# Patient Record
Sex: MALE | Race: WHITE | Employment: OTHER | URBAN - METROPOLITAN AREA
[De-identification: names, ages, dates, MRNs, and addresses within clinical notes are randomized per-mention and may not be internally consistent; named-entity substitution may affect disease eponyms.]

---

## 2021-09-13 ENCOUNTER — HOSPITAL ENCOUNTER (OUTPATIENT)
Dept: LAB | Age: 71
Discharge: HOME | End: 2021-09-13
Payer: MEDICARE

## 2021-09-13 DIAGNOSIS — R93.1 ELEVATED CORONARY ARTERY CALCIUM SCORE: ICD-10-CM

## 2021-09-13 DIAGNOSIS — I25.119 CORONARY ARTERY DISEASE INVOLVING NATIVE CORONARY ARTERY OF NATIVE HEART WITH ANGINA PECTORIS (HCC): ICD-10-CM

## 2021-09-13 PROBLEM — R07.9 CHEST PAIN: Status: ACTIVE | Noted: 2021-09-13

## 2021-09-13 PROBLEM — I10 ESSENTIAL HYPERTENSION: Status: ACTIVE | Noted: 2021-09-13

## 2021-09-13 PROBLEM — E78.00 PURE HYPERCHOLESTEROLEMIA: Status: ACTIVE | Noted: 2021-09-13

## 2021-09-13 LAB
ANION GAP SERPL CALC-SCNC: 8 MMOL/L (ref 7–16)
BASOPHILS # BLD: 0.1 K/UL (ref 0–0.2)
BASOPHILS NFR BLD: 1 % (ref 0–2)
BUN SERPL-MCNC: 21 MG/DL (ref 8–23)
CALCIUM SERPL-MCNC: 9.6 MG/DL (ref 8.3–10.4)
CHLORIDE SERPL-SCNC: 104 MMOL/L (ref 98–107)
CO2 SERPL-SCNC: 28 MMOL/L (ref 21–32)
CREAT SERPL-MCNC: 1.09 MG/DL (ref 0.8–1.5)
DIFFERENTIAL METHOD BLD: NORMAL
EOSINOPHIL # BLD: 0.3 K/UL (ref 0–0.8)
EOSINOPHIL NFR BLD: 6 % (ref 0.5–7.8)
ERYTHROCYTE [DISTWIDTH] IN BLOOD BY AUTOMATED COUNT: 13.5 % (ref 11.9–14.6)
GLUCOSE SERPL-MCNC: 101 MG/DL (ref 65–100)
HCT VFR BLD AUTO: 44.1 % (ref 41.1–50.3)
HGB BLD-MCNC: 14.4 G/DL (ref 13.6–17.2)
IMM GRANULOCYTES # BLD AUTO: 0 K/UL (ref 0–0.5)
IMM GRANULOCYTES NFR BLD AUTO: 0 % (ref 0–5)
INR PPP: 1
LYMPHOCYTES # BLD: 1.5 K/UL (ref 0.5–4.6)
LYMPHOCYTES NFR BLD: 27 % (ref 13–44)
MCH RBC QN AUTO: 30.3 PG (ref 26.1–32.9)
MCHC RBC AUTO-ENTMCNC: 32.7 G/DL (ref 31.4–35)
MCV RBC AUTO: 92.8 FL (ref 79.6–97.8)
MONOCYTES # BLD: 0.6 K/UL (ref 0.1–1.3)
MONOCYTES NFR BLD: 11 % (ref 4–12)
NEUTS SEG # BLD: 3 K/UL (ref 1.7–8.2)
NEUTS SEG NFR BLD: 55 % (ref 43–78)
NRBC # BLD: 0 K/UL (ref 0–0.2)
PLATELET # BLD AUTO: 218 K/UL (ref 150–450)
PMV BLD AUTO: 11.3 FL (ref 9.4–12.3)
POTASSIUM SERPL-SCNC: 4 MMOL/L (ref 3.5–5.1)
PROTHROMBIN TIME: 13 SEC (ref 12.6–14.5)
RBC # BLD AUTO: 4.75 M/UL (ref 4.23–5.6)
SODIUM SERPL-SCNC: 140 MMOL/L (ref 136–145)
WBC # BLD AUTO: 5.5 K/UL (ref 4.3–11.1)

## 2021-09-13 PROCEDURE — 80048 BASIC METABOLIC PNL TOTAL CA: CPT

## 2021-09-13 PROCEDURE — 85610 PROTHROMBIN TIME: CPT

## 2021-09-13 PROCEDURE — 36415 COLL VENOUS BLD VENIPUNCTURE: CPT

## 2021-09-13 PROCEDURE — 85025 COMPLETE CBC W/AUTO DIFF WBC: CPT

## 2021-09-13 NOTE — PROGRESS NOTES
Patient pre-assessment complete for [facility] poss with Dr Abdulaziz Mueller scheduled for 21 at 11am , arrival time 9am. Patient verified using . Patient instructed to bring all home medications in labeled bottles on the day of procedure. NPO status reinforced. Patient informed to take a full dose aspirin 325mg  or 81 mg x 4 on the day of procedure. Patient instructed to HOLD HCTZ in am. Instructed they can take all other medications excluding vitamins & supplements. Patient verbalizes understanding of all instructions & denies any questions at this time.

## 2021-09-14 ENCOUNTER — HOSPITAL ENCOUNTER (OUTPATIENT)
Age: 71
Setting detail: OUTPATIENT SURGERY
Discharge: HOME | End: 2021-09-14
Attending: INTERNAL MEDICINE | Admitting: INTERNAL MEDICINE
Payer: MEDICARE

## 2021-09-14 VITALS
WEIGHT: 200 LBS | DIASTOLIC BLOOD PRESSURE: 76 MMHG | HEIGHT: 69 IN | SYSTOLIC BLOOD PRESSURE: 124 MMHG | BODY MASS INDEX: 29.62 KG/M2 | OXYGEN SATURATION: 96 % | HEART RATE: 76 BPM

## 2021-09-14 DIAGNOSIS — R07.9 CHEST PAIN: ICD-10-CM

## 2021-09-14 DIAGNOSIS — I25.119 CORONARY ARTERY DISEASE INVOLVING NATIVE CORONARY ARTERY OF NATIVE HEART WITH ANGINA PECTORIS (HCC): ICD-10-CM

## 2021-09-14 DIAGNOSIS — R07.2 PRECORDIAL PAIN: ICD-10-CM

## 2021-09-14 LAB
ATRIAL RATE: 63 BPM
ATRIAL RATE: 72 BPM
CALCULATED P AXIS, ECG09: 13 DEGREES
CALCULATED P AXIS, ECG09: 20 DEGREES
CALCULATED R AXIS, ECG10: 20 DEGREES
CALCULATED R AXIS, ECG10: 42 DEGREES
CALCULATED T AXIS, ECG11: 42 DEGREES
CALCULATED T AXIS, ECG11: 45 DEGREES
DIAGNOSIS, 93000: NORMAL
DIAGNOSIS, 93000: NORMAL
P-R INTERVAL, ECG05: 238 MS
P-R INTERVAL, ECG05: 242 MS
Q-T INTERVAL, ECG07: 382 MS
Q-T INTERVAL, ECG07: 400 MS
QRS DURATION, ECG06: 80 MS
QRS DURATION, ECG06: 86 MS
QTC CALCULATION (BEZET), ECG08: 409 MS
QTC CALCULATION (BEZET), ECG08: 418 MS
VENTRICULAR RATE, ECG03: 63 BPM
VENTRICULAR RATE, ECG03: 72 BPM

## 2021-09-14 PROCEDURE — 74011250637 HC RX REV CODE- 250/637: Performed by: INTERNAL MEDICINE

## 2021-09-14 PROCEDURE — C1769 GUIDE WIRE: HCPCS | Performed by: INTERNAL MEDICINE

## 2021-09-14 PROCEDURE — 93458 L HRT ARTERY/VENTRICLE ANGIO: CPT | Performed by: INTERNAL MEDICINE

## 2021-09-14 PROCEDURE — 92928 PRQ TCAT PLMT NTRAC ST 1 LES: CPT | Performed by: INTERNAL MEDICINE

## 2021-09-14 PROCEDURE — C1725 CATH, TRANSLUMIN NON-LASER: HCPCS | Performed by: INTERNAL MEDICINE

## 2021-09-14 PROCEDURE — C1887 CATHETER, GUIDING: HCPCS | Performed by: INTERNAL MEDICINE

## 2021-09-14 PROCEDURE — 77030012468 HC VLV BLEEDBK CNTRL ABBT -B: Performed by: INTERNAL MEDICINE

## 2021-09-14 PROCEDURE — 99152 MOD SED SAME PHYS/QHP 5/>YRS: CPT | Performed by: INTERNAL MEDICINE

## 2021-09-14 PROCEDURE — 77030015766: Performed by: INTERNAL MEDICINE

## 2021-09-14 PROCEDURE — 74011000250 HC RX REV CODE- 250: Performed by: INTERNAL MEDICINE

## 2021-09-14 PROCEDURE — 99153 MOD SED SAME PHYS/QHP EA: CPT | Performed by: INTERNAL MEDICINE

## 2021-09-14 PROCEDURE — 77030042317 HC BND COMPR HEMSTAT -B: Performed by: INTERNAL MEDICINE

## 2021-09-14 PROCEDURE — 74011000258 HC RX REV CODE- 258: Performed by: INTERNAL MEDICINE

## 2021-09-14 PROCEDURE — 74011000636 HC RX REV CODE- 636: Performed by: INTERNAL MEDICINE

## 2021-09-14 PROCEDURE — 77030016699 HC CATH ANGI DX INFN1 CARD -A: Performed by: INTERNAL MEDICINE

## 2021-09-14 PROCEDURE — C1894 INTRO/SHEATH, NON-LASER: HCPCS | Performed by: INTERNAL MEDICINE

## 2021-09-14 PROCEDURE — 74011250636 HC RX REV CODE- 250/636: Performed by: INTERNAL MEDICINE

## 2021-09-14 PROCEDURE — 77030004558 HC CATH ANGI DX SUPR TORQ CARD -A: Performed by: INTERNAL MEDICINE

## 2021-09-14 PROCEDURE — 93005 ELECTROCARDIOGRAM TRACING: CPT | Performed by: INTERNAL MEDICINE

## 2021-09-14 PROCEDURE — C1874 STENT, COATED/COV W/DEL SYS: HCPCS | Performed by: INTERNAL MEDICINE

## 2021-09-14 DEVICE — IMPLANTABLE DEVICE: Type: IMPLANTABLE DEVICE | Status: FUNCTIONAL

## 2021-09-14 DEVICE — STENT RONYX30018UX RESOLUTE ONYX 3.00X18
Type: IMPLANTABLE DEVICE | Status: FUNCTIONAL
Brand: RESOLUTE ONYX™

## 2021-09-14 RX ORDER — GUAIFENESIN 100 MG/5ML
81-324 LIQUID (ML) ORAL ONCE
Status: DISCONTINUED | OUTPATIENT
Start: 2021-09-14 | End: 2021-09-14 | Stop reason: HOSPADM

## 2021-09-14 RX ORDER — HEPARIN SODIUM 200 [USP'U]/100ML
INJECTION, SOLUTION INTRAVENOUS
Status: COMPLETED | OUTPATIENT
Start: 2021-09-14 | End: 2021-09-14

## 2021-09-14 RX ORDER — FENTANYL CITRATE 50 UG/ML
INJECTION, SOLUTION INTRAMUSCULAR; INTRAVENOUS AS NEEDED
Status: DISCONTINUED | OUTPATIENT
Start: 2021-09-14 | End: 2021-09-14 | Stop reason: HOSPADM

## 2021-09-14 RX ORDER — SODIUM CHLORIDE 9 MG/ML
75 INJECTION, SOLUTION INTRAVENOUS CONTINUOUS
Status: DISCONTINUED | OUTPATIENT
Start: 2021-09-14 | End: 2021-09-14 | Stop reason: HOSPADM

## 2021-09-14 RX ORDER — SODIUM CHLORIDE 0.9 % (FLUSH) 0.9 %
5-40 SYRINGE (ML) INJECTION EVERY 8 HOURS
Status: DISCONTINUED | OUTPATIENT
Start: 2021-09-14 | End: 2021-09-14 | Stop reason: HOSPADM

## 2021-09-14 RX ORDER — SODIUM CHLORIDE 0.9 % (FLUSH) 0.9 %
5-40 SYRINGE (ML) INJECTION AS NEEDED
Status: DISCONTINUED | OUTPATIENT
Start: 2021-09-14 | End: 2021-09-14 | Stop reason: HOSPADM

## 2021-09-14 RX ORDER — ROSUVASTATIN CALCIUM 40 MG/1
40 TABLET, COATED ORAL DAILY
Qty: 90 TABLET | Refills: 3 | Status: SHIPPED | OUTPATIENT
Start: 2021-09-14 | End: 2022-01-25 | Stop reason: SDUPTHER

## 2021-09-14 RX ORDER — SODIUM CHLORIDE 9 MG/ML
250 INJECTION, SOLUTION INTRAVENOUS CONTINUOUS
Status: ACTIVE | OUTPATIENT
Start: 2021-09-14 | End: 2021-09-14

## 2021-09-14 RX ORDER — MIDAZOLAM HYDROCHLORIDE 1 MG/ML
INJECTION, SOLUTION INTRAMUSCULAR; INTRAVENOUS AS NEEDED
Status: DISCONTINUED | OUTPATIENT
Start: 2021-09-14 | End: 2021-09-14 | Stop reason: HOSPADM

## 2021-09-14 RX ORDER — LIDOCAINE HYDROCHLORIDE 10 MG/ML
INJECTION INFILTRATION; PERINEURAL AS NEEDED
Status: DISCONTINUED | OUTPATIENT
Start: 2021-09-14 | End: 2021-09-14 | Stop reason: HOSPADM

## 2021-09-14 RX ORDER — ASPIRIN 81 MG/1
81 TABLET ORAL DAILY
Qty: 1 TABLET | Refills: 0 | Status: SHIPPED
Start: 2021-09-14

## 2021-09-14 RX ADMIN — SODIUM CHLORIDE 75 ML/HR: 900 INJECTION, SOLUTION INTRAVENOUS at 09:12

## 2021-09-14 NOTE — PROGRESS NOTES
TRANSFER - IN REPORT:    Verbal report received from [facility] on Karin Love  being received from Cath Lab for routine progression of care      Report consisted of patients Situation, Background, Assessment and   Recommendations(SBAR). Information from the following report(s) SBAR was reviewed with the receiving nurse. Opportunity for questions and clarification was provided. Assessment completed upon patients arrival to unit and care assumed.

## 2021-09-14 NOTE — DISCHARGE SUMMARY
[facility] Cardiology Discharge Summary     Patient ID:  Marci Aguilar  [id number]  70 y.o.  1950    Admit date: 9/14/2021    Discharge date:  9/14/2021    Admitting Physician: Negro Rasmussen MD     Discharge Physician: Dr. Lebron Maravilla    Admission Diagnoses: Chest pain [R07.9]    Discharge Diagnoses:    Diagnosis    Coronary artery disease involving native coronary artery of native heart with angina pectoris ([address]    Elevated coronary artery calcium score    Essential hypertension    Pure hypercholesterolemia    Chest pain    Status post PCI     Cardiology Procedures this admission:  Left heart catheterization with PCI  Consults: None    Hospital Course: Patient was seen at the office of [facility] Cardiology by Dr. Daniela Nava for complaints of elevated calcium score, worsening PERSAUD, fatigue and was subsequently scheduled for an AM Admission [facility] at [facility] on 9/14/21. Patient underwent cardiac catheterization by Dr. Lebron Maravilla. Patient was found to have 90% stenosis of the proximal/mid LAD that was stented with overlapping 3.50 x 26 mm and 3.0 x 18 mm Resolute Chun GRISEL with 0% residual stenosis. Patient also found to have mild to moderate nonobstructive disease of the left circumflex and right coronary arteries. Patient tolerated the procedure well and was taken to recovery. Later that afternoon, patient was up feeling well without any complaints of chest pain or shortness of breath. Patient's right radial cath site was clean, dry and intact without hematoma or bruit. Patient's labs were WNL. Patient was seen and examined by Dr. Lebron Maravilla and determined stable and ready for discharge. Patient was instructed on the importance of medication compliance including taking aspirin and Brilinta everyday without missing a dose. After receiving drug eluting stents, the patient will remain on dual anti-platelet therapy for 1 year.   For maximized medical therapy for CAD, patient will continue ACE-I and statin as well. The patient will follow up with [facility] -- Dr Marilu Golden on Oct 11th at 1:00 pm in [facility]. Patient has been referred to cardiac rehab. DISPOSITION: The patient is being discharged home in stable condition on a low saturated fat, low cholesterol and low salt diet. The patient is instructed to advance activities as tolerated to the limit of fatigue or shortness of breath. The patient is instructed to avoid all heavy lifting, straining, stooping or squatting for 3-5 days. The patient is instructed to watch the cath site for bleeding/oozing; if seen, the patient is instructed to apply firm pressure with a clean cloth and call [facility] at [phone]. The patient is instructed to watch for signs of infection which include: increasing area of redness, fever/hot to touch or purulent drainage at the catheterization site. The patient is instructed not to soak in a bathtub for 7-10 days, but is cleared to shower. The patient is instructed to call the office or return to the ER for immediate evaluation for any shortness of breath or chest pain not relieved by NTG. Discharge Exam: Patient has been seen by Dr. Halima Kan: see his progress note for exam details.     Visit Vitals  /76   Pulse 68   Ht 5' 9\" (1.753 m)   Wt 90.7 kg (200 lb)   SpO2 96%   BMI 29.53 kg/m²       Recent Results (from the past 24 hour(s))   EKG, 12 LEAD, INITIAL    Collection Time: 09/14/21  9:48 AM   Result Value Ref Range    Ventricular Rate 63 BPM    Atrial Rate 63 BPM    P-R Interval 242 ms    QRS Duration 86 ms    Q-T Interval 400 ms    QTC Calculation (Bezet) 409 ms    Calculated P Axis 13 degrees    Calculated R Axis 20 degrees    Calculated T Axis 42 degrees    Diagnosis       Sinus rhythm with 1st degree A-V block  Otherwise normal ECG  No previous ECGs available  Confirmed by Yandel Williamson ([id number]) on 9/14/2021 11:23:45 AM       Patient Instructions:   Current Discharge Medication List      START taking these medications    Details   rosuvastatin (CRESTOR) 40 mg tablet Take 1 Tablet by mouth daily. Qty: 90 Tablet, Refills: 3      aspirin delayed-release 81 mg tablet Take 1 Tablet by mouth daily. Qty: 1 Tablet, Refills: 0      ticagrelor (Brilinta) 90 mg tablet Take 1 Tablet by mouth two (2) times a day. Qty: 60 Tablet, Refills: 11         CONTINUE these medications which have NOT CHANGED    Details   hydroCHLOROthiazide (HYDRODIURIL) 25 mg tablet Take 12.5 mg by mouth daily. benazepril (LOTENSIN) 20 mg tablet Take 20 mg by mouth nightly. co-enzyme Q-10 (CO Q-10) 100 mg capsule Take 200 mg by mouth daily. HYDROcodone-acetaminophen (NORCO) 7.5-325 mg per tablet Take 1-2 Tabs by mouth every four (4) hours as needed. Qty: 60 Tab, Refills: 0      traMADol (ULTRAM) 50 mg tablet Take 50 mg by mouth every six (6) hours as needed. Take if needed DOS per anesthesia protocol.           STOP taking these medications       lovastatin (MEVACOR) 40 mg tablet Comments:   Reason for Stopping:         aspirin (ASPIRIN) 325 mg tablet Comments:   Reason for Stopping:               Signed:  VITOR Barnett  9/14/2021  12:13 PM

## 2021-09-14 NOTE — PROGRESS NOTES
Patient received to [address] [place] [address] # 12  Ambulatory from [facility]. Patient scheduled for [facility] today with Dr Juda Hammans. Procedure reviewed & questions answered, voiced good understanding consent obtained & placed on chart. All medications and medical history reviewed. Will prep patient per orders. Patient & family updated on plan of care. The patient has a fraility score of 3-MANAGING WELL, based on patient A&Ox3, patient able to ambulate to room without difficulty.      Patient took aspirin 324mg at 0600 prior to arrival.

## 2021-09-14 NOTE — PROGRESS NOTES
TRANSFER - OUT REPORT:    Verbal report given to RN(name) on Evens Gupta  being transferred to CPRU(unit) for routine progression of care       Report consisted of patients Situation, Background, Assessment and   Recommendations(SBAR). Information from the following report(s) SBAR was reviewed with the receiving nurse.     [facility] with Dr Brenna Brown  2 stents to prox LAD (placed together to extend length)  R Radial  TR band at 12mL  Versed 2mg  Fentanyl 50mcg  Heparin 5000 units in radial cocktail  Angiomax bolus and gtt off at 1125  Brilinta 180mg

## 2021-09-14 NOTE — Clinical Note
TRANSFER - IN REPORT:     Verbal report received from: cpru rn. Report consisted of patient's Situation, Background, Assessment and   Recommendations(SBAR). Opportunity for questions and clarification was provided. Assessment completed upon patient's arrival to unit and care assumed.

## 2021-09-14 NOTE — PROGRESS NOTES
Discharge Same Day as PCI Teaching    Discharge teaching completed. Patient instructed on right radial site care, including symptoms to report to MD, medication changes & Follow up Care to include:    1- Patient is being treated with 2 separate anti-platelet medications including aspirin  & Brilinta. It is very important that these medications are filled today start Brilinta tonight. Patient instructed on the importance of these medications & that these medications must not be stopped for any reason or without talking to the doctor first.  2-  Patient is also being discharged on a medication for cholesterol management (ie-statin) Crestor and instructed on the importance of continuing this medication as well. 3- Patient received a referral for Cardiac Rehab II, contact information was faxed to Cardiac rehab & patient given telephone number to contact ([phone]) Cardiac Rehab if they have not heard from them within 10 days.

## 2021-09-14 NOTE — PROGRESS NOTES
1610-patient ambulated to bath room with no difficulties. Right radial with no bleeding or hematoma. Rband removed and sterile dressing applied to site    1615- all discharge instructions explained to patient and family. Time allowed for questions no. No questions and concerns at this time. 1620- right radialchecked. Site with no redness or bleeding. pt discharged to home via Wheelchair with family.

## 2021-09-14 NOTE — DISCHARGE INSTRUCTIONS
POST PCI/STENT DISCHARGE INSTRUCTIONS    1. Check puncture site frequently for swelling or bleeding. If there is any bleeding, lie down and apply pressure over the area with a clean towel or washcloth and call 911. Notify your doctor for any redness, swelling, drainage, or oozing from the puncture site. Notify your doctor for any fever or chills. 2. If the extremity becomes cold, numb, or painful call [facility] at [phone].    3. Activity should be limited for the next 48-72 hours. No heavy lifting (anything over 10 pounds) for 3 days. No pushing or pulling with right arm for the next three days. 4.  You may resume your usual diet. Drink more fluids than usual.    5.  Have a responsible person drive you home and stay with you for at least 24 hours after your heart catheterization/angiography. No driving for 24 hours. 6. You may remove bandage from your right arm in 24 hours. You may shower in 24 hours. No tub baths, hot tubs, or swimming for 1 week. Do not place any lotions, creams, powders, or ointments over puncture site for 1 week. You may place a clean band-aid over the puncture site each day for 5 days. Change daily. YOU ARE BEING DISCHARGED ON TWO ANTI-PLATELET MEDICATIONS    1- Aspirin 81mg    2- Brilinta    THESE MEDICATIONS  ARE VERY IMPORTANT TO YOUR RECOVERY AND  MUST BE TAKEN EXACTLY AS PRESCRIBED & NOT STOPPED FOR ANY REASON. THESE MAY ONLY BE STOPPED WITH AN ORDER FROM YOUR CARDIOLOGIST. PLEASE HAVE THESE FILLED IMMEDIATELY AND START TAKING Brilinta tonight and resume Aspirin in the morning. YOU ARE ALSO BEING DISCHARGED ON A MEDICATION FOR CHOLESTEROL MANAGEMENT. 1- Crestor    You have also been referred to [facility]. Someone from Cardiac Rehab will be calling you to set up a follow up appointment. If they have not called you within a week,  please call [phone].       Sedation for a Medical Procedure: Care Instructions     You were given a sedative medication during your visit. While many of the effects will have worn   off before you leave; you may continue to feel some effects for several hours. Common side effects from sedation include:  · Feeling sleepy. (Your doctors and nurses will make sure you are not too sleepy to go home.)  · Nausea and vomiting. This usually does not last long. · Feeling tired. How can you care for yourself at home? Activity    · Don't do anything for 24 hours that requires attention to detail. It takes time for the medicine effects to completely wear off. · Do not make important legal decisions for 24 hours. · Do not sign any legal documents for 24 hours. · Do not drink alcohol today     · For your safety, you should not drive or operate heavy machinery for the remainder of the day     · Rest when you feel tired. Getting enough sleep will help you recover. Diet    · You can eat your normal diet, unless your doctor gives you other instructions. If your stomach is upset, try clear liquids and bland, low-fat foods like plain toast or rice. · Drink plenty of fluids (unless your doctor tells you not to). · Don't drink alcohol for 24 hours. Medicines    · Be safe with medicines. Read and follow all instructions on the label. · If the doctor gave you a prescription medicine for pain, take it as prescribed. · If you are not taking a prescription pain medicine, ask your doctor if you can take an over-the-counter medicine. · If you think your pain medicine is making you sick to your stomach:  · Take your medicine after meals (unless your doctor has told you not to). · Ask your doctor for a different pain medicine. Percutaneous Coronary Intervention: What to Expect at [facility]     Percutaneous coronary intervention (PCI) is the name for procedures that are used to open a narrowed or blocked coronary artery.  The two most common PCI procedures are coronary angioplasty and coronary stent placement. Your groin or arm may have a bruise and feel sore for a day or two after a percutaneous coronary intervention (PCI). You can do light activities around the house, but nothing strenuous for several days. This care sheet gives you a general idea about how long it will take for you to recover. But each person recovers at a different pace. Follow the steps below to get better as quickly as possible. How can you care for yourself at home? Activity  · Do not do strenuous exercise and do not lift, pull, or push anything heavy until your doctor says it is okay. This may be for a day or two. You can walk around the house and do light activity, such as cooking. · You may shower 24 to 48 hours after the procedure, if your doctor okays it. Pat the incision dry. Do not take a bath for 1 week, or until your doctor tells you it is okay. · If the catheter was placed in your groin, try not to walk up stairs for the first couple of days. · If the catheter was placed in your arm near your wrist, do not bend your wrist deeply for the first couple of days. Be careful using your hand to get into and out of a chair or bed. · If your doctor recommends it, get more exercise. Walking is a good choice. Bit by bit, increase the amount you walk every day. Try for at least 30 minutes on most days of the week. Diet  · Drink plenty of fluids to help your body flush out the dye. If you have kidney, heart, or liver disease and have to limit fluids, talk with your doctor before you increase the amount of fluids you drink. · Keep eating a heart-healthy diet that has lots of fruits, vegetables, and whole grains. If you have not been eating this way, talk to your doctor. You also may want to talk to a dietitian. This expert can help you to learn about healthy foods and plan meals. Medicines  · Your doctor will tell you if and when you can restart your medicines.  He or she will also give you instructions about taking any new medicines. · If you take blood thinners, such as warfarin (Coumadin), clopidogrel (Plavix), or aspirin, be sure to talk to your doctor. He or she will tell you if and when to start taking those medicines again. Make sure that you understand exactly what your doctor wants you to do. · Your doctor will prescribe blood-thinning medicines. You will likely take aspirin plus another antiplatelet, such as clopidogrel (Plavix). It is very important that you take these medicines exactly as directed. These medicines help keep the coronary artery open and reduce your risk of a heart attack. · Call your doctor if you think you are having a problem with your medicine. Care of the catheter site  · For 1 or 2 days, keep a bandage over the spot where the catheter was inserted. The bandage probably will fall off in this time. · Put ice or a cold pack on the area for 10 to 20 minutes at a time to help with soreness or swelling. Put a thin cloth between the ice and your skin. Follow-up care is a key part of your treatment and safety. Be sure to make and go to all appointments, and call your doctor if you are having problems. It's also a good idea to know your test results and keep a list of the medicines you take. When should you call for help? Call 911 anytime you think you may need emergency care. For example, call if:  · You passed out (lost consciousness). · You have severe trouble breathing. · You have sudden chest pain and shortness of breath, or you cough up blood. · You have symptoms of a heart attack, such as:  ¨ Chest pain or pressure. ¨ Sweating. ¨ Shortness of breath. ¨ Nausea or vomiting. ¨ Pain that spreads from the chest to the neck, jaw, or one or both shoulders or arms. ¨ Dizziness or lightheadedness. ¨ A fast or uneven pulse. After calling 911, chew 1 adult-strength aspirin. Wait for an ambulance. Do not try to drive yourself.   · You have been diagnosed with angina, and you have angina symptoms that do not go away with rest or are not getting better within 5 minutes after you take one dose of nitroglycerin. Call your doctor now or seek immediate medical care if:  · You are bleeding from the area where the catheter was put in your artery. · You have a fast-growing, painful lump at the catheter site. · You have signs of infection, such as:  ¨ Increased pain, swelling, warmth, or redness. ¨ Red streaks leading from the catheter site. ¨ Pus draining from the catheter site. ¨ A fever. · Your leg or arm looks blue or feels cold, numb, or tingly. Watch closely for changes in your health, and be sure to contact your doctor if you have any problems. Where can you learn more? Go to [email or web address].be  Enter T154 in the search box to learn more about \"Percutaneous Coronary Intervention: What to Expect at Home. \"   © 8730-9482 [employer]. Care instructions adapted under license by [employer] (which disclaims liability or warranty for this information). This care instruction is for use with your licensed healthcare professional. If you have questions about a medical condition or this instruction, always ask your healthcare professional. [address] any warranty or liability for your use of this information. Content Version: 95.0.373869; Current as of: May 22, 2015         Cardiac Rehabilitation: Care Instructions  Your Care Instructions    Cardiac rehabilitation is a program for people who have a heart problem, such as a heart attack, heart failure, or a heart valve disease. The program includes exercise, lifestyle changes, education, and emotional support. Cardiac rehab can help you improve the quality of your life through better overall health. It can help you lose weight and feel better about yourself.   On your cardiac rehab team, you may have your doctor, a nurse specialist, a dietitian, and a physical therapist. They will design your cardiac rehab program specifically for you. You will learn how to reduce your risk for heart problems, how to manage stress, and how to eat a heart-healthy diet. By the end of the program, you will be ready to maintain a healthier lifestyle on your own. Follow-up care is a key part of your treatment and safety. Be sure to make and go to all appointments, and call your doctor if you are having problems. It's also a good idea to know your test results and keep a list of the medicines you take. How can you care for yourself at home? · Take your medicines exactly as prescribed. Call your doctor if you think you are having a problem with your medicine. You will get more details on the specific medicines your doctor prescribes. · Weigh yourself every day if your doctor tells you to. Watch for sudden weight gain. Weigh yourself on the same scale with the same amount of clothing at the same time of day. · Plan your meals so that you are eating heart-healthy foods. ¨ Eat a variety of foods daily. Fresh fruits and vegetables and whole-grains are good choices. ¨ Limit your fat intake, especially saturated and trans fat. ¨ Limit salt (sodium). ¨ Increase fiber in your diet. ¨ Limit alcohol. · Learn how to take your pulse so that you can track your heart rate during exercise. · Always check with your doctor before you begin a new exercise program.  · Warm up before you exercise and cool down afterward for at least 15 minutes each. This will help your heart gradually prepare for and recover from exercise and avoid pushing your heart too hard. · Stop exercising if you have any unusual discomfort, such as chest pain. · Do not smoke. Smoking can make heart problems worse. If you need help quitting, talk to your doctor about stop-smoking programs and medicines. These can increase your chances of quitting for good. When should you call for help? Call 911 anytime you think you may need emergency care. For example, call if:  · You have severe trouble breathing. · You cough up pink, foamy mucus and you have trouble breathing. · You have symptoms of a heart attack. These may include:  ¨ Chest pain or pressure, or a strange feeling in the chest.  ¨ Sweating. ¨ Shortness of breath. ¨ Nausea or vomiting. ¨ Pain, pressure, or a strange feeling in the back, neck, jaw, or upper belly or in one or both shoulders or arms. ¨ Lightheadedness or sudden weakness. ¨ A fast or irregular heartbeat. After you call 911, the  may tell you to chew 1 adult-strength or 2 to 4 low-dose aspirin. Wait for an ambulance. Do not try to drive yourself. · You have angina symptoms (such as chest pain or pressure) that do not go away with rest or are not getting better within 5 minutes after you take a dose of nitroglycerin. · You have symptoms of a stroke. These may include:  ¨ Sudden numbness, tingling, weakness, or loss of movement in your face, arm, or leg, especially on only one side of your body. ¨ Sudden vision changes. ¨ Sudden trouble speaking. ¨ Sudden confusion or trouble understanding simple statements. ¨ Sudden problems with walking or balance. ¨ A sudden, severe headache that is different from past headaches. · You passed out (lost consciousness). Call your doctor now or seek immediate medical care if:  · You have new or increased shortness of breath. · You are dizzy or lightheaded, or you feel like you may faint. · You gain weight suddenly, such as 3 pounds or more in 2 to 3 days. (Your doctor may suggest a different range of weight gain.)  · You have increased swelling in your legs, ankles, or feet. Watch closely for changes in your health, and be sure to contact your doctor if you have any problems. Where can you learn more? Go to http://www.gray.com/. Enter P369 in the search box to learn more about \"Cardiac Rehabilitation: Care Instructions. \"  Current as of:  May 5, 2016  Content Version: 11.1  © 8543-6354 [employer]. Care instructions adapted under license by [employer] (which disclaims liability or warranty for this information). If you have questions about a medical condition or this instruction, always ask your healthcare professional. [address] any warranty or liability for your use of this information. [employer] Activation    Thank you for requesting access to [facility]. Please follow the instructions below to securely access and download your online medical record. [facility] allows you to send messages to your doctor, view your test results, renew your prescriptions, schedule appointments, and more. How Do I Sign Up? 1. In your internet browser, go to [email or web address]. 2. Click on the First Time User? Click Here link in the Sign In box. You will see the New Member Sign Up page. 3. Enter your [facility] Access Code exactly as it appears below. You will not need to use this code after youve completed the sign-up process. If you do not sign up before the expiration date, you must request a new code. [facility] Access Code: Activation code not generated  Current [facility] Status: Active (This is the date your [facility] access code will )    4. Enter the last four digits of your Social Security Number (xxxx) and Date of Birth (mm/dd/yyyy) as indicated and click Submit. You will be taken to the next sign-up page. 5. Create a [facility] ID. This will be your [facility] login ID and cannot be changed, so think of one that is secure and easy to remember. 6. Create a [facility] password. You can change your password at any time. 7. Enter your Password Reset Question and Answer. This can be used at a later time if you forget your password. 8. Enter your e-mail address. You will receive e-mail notification when new information is available in [address] 9. Click Sign Up.  You can now view and download portions of your medical record. 10. Click the Download Summary menu link to download a portable copy of your medical information. Additional Information    If you have questions, please visit the Frequently Asked Questions section of the "[employer]" website at [email or web address] Remember, "[employer]" is NOT to be used for urgent needs. For medical emergencies, dial 911.

## 2021-10-11 PROBLEM — I25.10 CORONARY ARTERY DISEASE INVOLVING NATIVE CORONARY ARTERY OF NATIVE HEART WITHOUT ANGINA PECTORIS: Status: ACTIVE | Noted: 2021-09-13

## 2022-03-19 PROBLEM — R07.9 CHEST PAIN: Status: ACTIVE | Noted: 2021-09-13

## 2022-03-19 PROBLEM — I10 ESSENTIAL HYPERTENSION: Status: ACTIVE | Noted: 2021-09-13

## 2022-03-19 PROBLEM — R93.1 ELEVATED CORONARY ARTERY CALCIUM SCORE: Status: ACTIVE | Noted: 2021-09-13

## 2022-03-19 PROBLEM — I25.10 CORONARY ARTERY DISEASE INVOLVING NATIVE CORONARY ARTERY OF NATIVE HEART WITHOUT ANGINA PECTORIS: Status: ACTIVE | Noted: 2021-09-13

## 2022-03-20 PROBLEM — E78.00 PURE HYPERCHOLESTEROLEMIA: Status: ACTIVE | Noted: 2021-09-13

## 2022-08-23 ENCOUNTER — OFFICE VISIT (OUTPATIENT)
Dept: CARDIOLOGY CLINIC | Age: 72
End: 2022-08-23
Payer: MEDICARE

## 2022-08-23 VITALS
DIASTOLIC BLOOD PRESSURE: 80 MMHG | HEIGHT: 69 IN | WEIGHT: 205 LBS | BODY MASS INDEX: 30.36 KG/M2 | SYSTOLIC BLOOD PRESSURE: 138 MMHG | HEART RATE: 64 BPM

## 2022-08-23 DIAGNOSIS — I25.10 CORONARY ARTERY DISEASE INVOLVING NATIVE CORONARY ARTERY OF NATIVE HEART WITHOUT ANGINA PECTORIS: ICD-10-CM

## 2022-08-23 DIAGNOSIS — I25.10 ATHEROSCLEROSIS OF NATIVE CORONARY ARTERY OF NATIVE HEART WITHOUT ANGINA PECTORIS: Primary | ICD-10-CM

## 2022-08-23 DIAGNOSIS — I10 ESSENTIAL HYPERTENSION: ICD-10-CM

## 2022-08-23 DIAGNOSIS — E78.00 PURE HYPERCHOLESTEROLEMIA: ICD-10-CM

## 2022-08-23 PROBLEM — R93.1 ELEVATED CORONARY ARTERY CALCIUM SCORE: Status: RESOLVED | Noted: 2021-09-13 | Resolved: 2022-08-23

## 2022-08-23 PROCEDURE — 93000 ELECTROCARDIOGRAM COMPLETE: CPT | Performed by: INTERNAL MEDICINE

## 2022-08-23 PROCEDURE — G8427 DOCREV CUR MEDS BY ELIG CLIN: HCPCS | Performed by: INTERNAL MEDICINE

## 2022-08-23 PROCEDURE — 1123F ACP DISCUSS/DSCN MKR DOCD: CPT | Performed by: INTERNAL MEDICINE

## 2022-08-23 PROCEDURE — G8417 CALC BMI ABV UP PARAM F/U: HCPCS | Performed by: INTERNAL MEDICINE

## 2022-08-23 PROCEDURE — 3017F COLORECTAL CA SCREEN DOC REV: CPT | Performed by: INTERNAL MEDICINE

## 2022-08-23 PROCEDURE — 99214 OFFICE O/P EST MOD 30 MIN: CPT | Performed by: INTERNAL MEDICINE

## 2022-08-23 PROCEDURE — 1036F TOBACCO NON-USER: CPT | Performed by: INTERNAL MEDICINE

## 2022-08-23 NOTE — PROGRESS NOTES
[address], [address] [facility], [address] [place] [address]  PHONE: [phone]     22    NAME:  Mindi Carmichael  : 1950  MRN: [id number]       SUBJECTIVE:   Mindi Carmichael is a 67 y.o. male seen for a follow up visit regarding the following:     Chief Complaint   Patient presents with    Coronary Artery Disease     6 month f/u        HPI:   LHC/PCI 2021:  (contrast induced CKD afterwards)    Complex proximal/mid LAD ulcerated mixed plaque morphology treated with Justin drug-eluting stent x2    Mild to moderate nonobstructive disease of the left circumflex and right coronary arteries. Echo 2021: normal EF      On the treadmill at the gym, no angina. No new BERRY, edema. Sleeping more now. Some knee issues, better now. NO angina, CP. Feeling well now. Patient denies recent history of orthopnea, PND, excessive dizziness and/or syncope. 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 Been on ASA and statin. Past Medical History, Past Surgical History, Family history, Social History, and Medications were all reviewed with the patient today and updated as necessary. Current Outpatient Medications   Medication Sig Dispense Refill    aspirin 81 MG EC tablet Take 81 mg by mouth daily      benazepril (LOTENSIN) 20 MG tablet Take 20 mg by mouth      coenzyme Q10 100 MG CAPS capsule Take 200 mg by mouth daily      rosuvastatin (CRESTOR) 40 MG tablet Take 40 mg by mouth daily      ticagrelor (BRILINTA) 90 MG TABS tablet Take 90 mg by mouth 2 times daily       No current facility-administered medications for this visit.         Allergies   Allergen Reactions    Oxycodone Hives     itching     Patient Active Problem List    Diagnosis Date Noted    Coronary artery disease involving native coronary artery of native heart without angina pectoris 2021    Chest pain 2021     Added automatically from request for surgery [id number]        Essential hypertension 09/13/2021    Pure hypercholesterolemia 09/13/2021    Status post hip replacement 02/22/2013      Past Surgical History:   Procedure Laterality Date    ORTHOPEDIC SURGERY  1987    right ruptured achilles tendon repair    TOTAL HIP ARTHROPLASTY  2/2013    left     Family History   Problem Relation Age of Onset    Sudden Death Maternal Uncle     Coronary Art Dis Maternal Uncle      Social History     Tobacco Use    Smoking status: Never    Smokeless tobacco: Never   Substance Use Topics    Alcohol use: Yes         ROS:    No obvious pertinent positives on review of systems except for what was outlined in the HPI today. PHYSICAL EXAM:     /80 Comment: Home re-check  Pulse 64 Comment: per EKG  Ht 5' 9\" (1.753 m)   Wt 205 lb (93 kg)   BMI 30.27 kg/m²    General/Constitutional:   Alert and oriented x 3, no acute distress  HEENT:   normocephalic, atraumatic, moist mucous membranes  Neck:   No JVD or carotid bruits bilaterally  Cardiovascular:   regular rate and rhythm, no murmur/rub/gallop appreciated  Pulmonary:   clear to auscultation bilaterally, no respiratory distress  Abdomen:   soft, non-tender, non-distended  Ext:   No sig LE edema bilaterally  Skin:  warm and dry, no obvious rashes seen  Neuro:   no obvious sensory or motor deficits  Psychiatric:   normal mood and affect      EKG Today and independently reviewed by me: sinus rhythm, normal intervals and non-specific ST/T wave changes.       Lab Results   Component Value Date/Time     09/13/2021 12:00 PM    K 4.0 09/13/2021 12:00 PM     09/13/2021 12:00 PM    CO2 28 09/13/2021 12:00 PM    BUN 21 09/13/2021 12:00 PM    CREATININE 1.09 09/13/2021 12:00 PM    GLUCOSE 101 09/13/2021 12:00 PM    CALCIUM 9.6 09/13/2021 12:00 PM        Lab Results   Component Value Date    WBC 5.5 09/13/2021    HGB 14.4 09/13/2021    HCT 44.1 09/13/2021    MCV 92.8 09/13/2021     09/13/2021       No results found for: TSHFT4, TSH    No results found for: LABA1C  No results found for: EAG    No results found for: CHOL  No results found for: TRIG  No results found for: HDL  No results found for: LDLCHOLESTEROL, LDLCALC  No results found for: LABVLDL, VLDL  No results found for: CHOLHDLRATIO        I have Independently reviewed prior care notes, any ER records available, cardiac testing, labs and results with the patient and before seeing the patient today. Also independently reviewed outside records when available. ASSESSMENT:    Xi Lombardo was seen today for coronary artery disease. Diagnoses and all orders for this visit:    Atherosclerotic heart disease of native coronary artery without angina pectoris  -     EKG 12 lead; Future  -     EKG 12 lead    Essential hypertension    Coronary artery disease involving native coronary artery of native heart without angina pectoris    Pure hypercholesterolemia        PLAN:    1. CAD:  Remain on DAPT, crestor. DAPT long term, doing well on this now. Echo ok. The patient has been instructed to call with any angina or equivalent as reviewed today. All questions were answered with the patient voicing complete understanding. 2. HPL:  Remain on crestor, goal LDL < 70.        3. Carotid Dz: Followed by Dr. Genet Bedoya. 4. HTN:  Follow Cr, possible contrast induced CKD. Off the HCTZ. Getting renal consult. Patient has been instructed and agrees to call our office with any issues or other concerns related to their cardiac condition(s) and/or complaint(s). Return in about 6 months (around 2/23/2023).        KATHERINE MAIER,   8/23/2022

## 2023-03-14 ENCOUNTER — OFFICE VISIT (OUTPATIENT)
Dept: CARDIOLOGY CLINIC | Age: 73
End: 2023-03-14
Payer: COMMERCIAL

## 2023-03-14 VITALS
HEART RATE: 72 BPM | HEIGHT: 69 IN | SYSTOLIC BLOOD PRESSURE: 132 MMHG | DIASTOLIC BLOOD PRESSURE: 76 MMHG | WEIGHT: 210.4 LBS | BODY MASS INDEX: 31.16 KG/M2

## 2023-03-14 DIAGNOSIS — I25.10 CORONARY ARTERY DISEASE INVOLVING NATIVE CORONARY ARTERY OF NATIVE HEART WITHOUT ANGINA PECTORIS: Primary | ICD-10-CM

## 2023-03-14 DIAGNOSIS — E78.00 PURE HYPERCHOLESTEROLEMIA: ICD-10-CM

## 2023-03-14 DIAGNOSIS — I10 ESSENTIAL HYPERTENSION: ICD-10-CM

## 2023-03-14 PROCEDURE — 3078F DIAST BP <80 MM HG: CPT | Performed by: INTERNAL MEDICINE

## 2023-03-14 PROCEDURE — 99214 OFFICE O/P EST MOD 30 MIN: CPT | Performed by: INTERNAL MEDICINE

## 2023-03-14 PROCEDURE — 1123F ACP DISCUSS/DSCN MKR DOCD: CPT | Performed by: INTERNAL MEDICINE

## 2023-03-14 PROCEDURE — 3075F SYST BP GE 130 - 139MM HG: CPT | Performed by: INTERNAL MEDICINE

## 2023-03-14 RX ORDER — VALSARTAN AND HYDROCHLOROTHIAZIDE 80; 12.5 MG/1; MG/1
1 TABLET, FILM COATED ORAL DAILY
COMMUNITY
Start: 2023-01-18 | End: 2024-01-18

## 2023-03-14 RX ORDER — ROSUVASTATIN CALCIUM 40 MG/1
40 TABLET, COATED ORAL DAILY
Qty: 90 TABLET | Refills: 3 | Status: SHIPPED | OUTPATIENT
Start: 2023-03-14

## 2023-03-14 RX ORDER — CETIRIZINE HYDROCHLORIDE 10 MG/1
10 TABLET ORAL DAILY
COMMUNITY

## 2023-03-14 NOTE — PROGRESS NOTES
[address], [address]  PHONE: [phone]     23    NAME:  Krystyna Rubalcava  : 1950  MRN: [id number]       SUBJECTIVE:   Krystyna Rubalcava is a 68 y.o. male seen for a follow up visit regarding the following:     Chief Complaint   Patient presents with    Coronary Artery Disease     6 month follow up       HPI:   LHC/PCI 2021:  (contrast induced CKD afterwards)    Complex proximal/mid LAD ulcerated mixed plaque morphology treated with [place] drug-eluting stent x2    Mild to moderate nonobstructive disease of the left circumflex and right coronary arteries. Echo 2021: normal EF      On the treadmill at the gym, no angina. No new BERRY, edema. diet better now. NO angina, CP. Feeling well now. Patient denies recent history of orthopnea, PND, excessive dizziness and/or syncope. 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 Been on ASA and statin. Past Medical History, Past Surgical History, Family history, Social History, and Medications were all reviewed with the patient today and updated as necessary. Current Outpatient Medications   Medication Sig Dispense Refill    valsartan-hydroCHLOROthiazide (DIOVAN-HCT) 80-12.5 MG per tablet Take 1 tablet by mouth daily      cetirizine (ZYRTEC) 10 MG tablet Take 10 mg by mouth daily      ticagrelor (BRILINTA) 90 MG TABS tablet Take 1 tablet by mouth 2 times daily 180 tablet 3    rosuvastatin (CRESTOR) 40 MG tablet Take 1 tablet by mouth daily 90 tablet 3    aspirin 81 MG EC tablet Take 81 mg by mouth daily      coenzyme Q10 100 MG CAPS capsule Take 200 mg by mouth daily (Patient not taking: Reported on 3/14/2023)       No current facility-administered medications for this visit.         Allergies   Allergen Reactions    Oxycodone Hives     itching     Patient Active Problem List    Diagnosis Date Noted    Coronary artery disease involving native coronary artery of native heart without angina pectoris 09/13/2021    Chest pain 09/13/2021     Added automatically from request for surgery [id number]        Essential hypertension 09/13/2021    Pure hypercholesterolemia 09/13/2021    Status post hip replacement 02/22/2013      Past Surgical History:   Procedure Laterality Date    ORTHOPEDIC SURGERY  1987    right ruptured achilles tendon repair    TOTAL HIP ARTHROPLASTY  2/2013    left     Family History   Problem Relation Age of Onset    Sudden Death Maternal Uncle     Coronary Art Dis Maternal Uncle      Social History     Tobacco Use    Smoking status: Never    Smokeless tobacco: Never   Substance Use Topics    Alcohol use: Yes         ROS:    No obvious pertinent positives on review of systems except for what was outlined in the HPI today.       PHYSICAL EXAM:     /76   Pulse 72   Ht 5' 9\" (1.753 m)   Wt 210 lb 6.4 oz (95.4 kg)   BMI 31.07 kg/m²    General/Constitutional:   Alert and oriented x 3, no acute distress  HEENT:   normocephalic, atraumatic, moist mucous membranes  Neck:   No JVD or carotid bruits bilaterally  Cardiovascular:   regular rate and rhythm, no murmur/rub/gallop appreciated  Pulmonary:   clear to auscultation bilaterally, no respiratory distress  Abdomen:   soft, non-tender, non-distended  Ext:   No sig LE edema bilaterally  Skin:  warm and dry, no obvious rashes seen  Neuro:   no obvious sensory or motor deficits  Psychiatric:   normal mood and affect      Lab Results   Component Value Date/Time     09/13/2021 12:00 PM    K 4.0 09/13/2021 12:00 PM     09/13/2021 12:00 PM    CO2 28 09/13/2021 12:00 PM    BUN 21 09/13/2021 12:00 PM    CREATININE 1.09 09/13/2021 12:00 PM    GLUCOSE 101 09/13/2021 12:00 PM    CALCIUM 9.6 09/13/2021 12:00 PM        Lab Results   Component Value Date    WBC 5.5 09/13/2021    HGB 14.4 09/13/2021    HCT 44.1 09/13/2021    MCV 92.8 09/13/2021     09/13/2021       No results found for: TSHFT4, TSH    No results found for: LABA1C  No results found for: EAG    No results found for: CHOL  No results found for: TRIG  No results found for: HDL  No results found for: LDLCHOLESTEROL, LDLCALC  No results found for: LABVLDL, VLDL  No results found for: CHOLHDLRATIO        I have Independently reviewed prior care notes, any ER records available, cardiac testing, labs and results with the patient and before seeing the patient today. Also independently reviewed outside records when available. ASSESSMENT:    Mary Ann Pena was seen today for coronary artery disease. Diagnoses and all orders for this visit:    Coronary artery disease involving native coronary artery of native heart without angina pectoris    Essential hypertension    Pure hypercholesterolemia    Other orders  -     ticagrelor (BRILINTA) 90 MG TABS tablet; Take 1 tablet by mouth 2 times daily  -     rosuvastatin (CRESTOR) 40 MG tablet; Take 1 tablet by mouth daily        PLAN:    1. CAD:  Remain on DAPT, crestor. DAPT long term, doing well on this now. HbA1c < 6. BETTER! No more sodas. The patient has been instructed to call with any angina or equivalent as reviewed today. All questions were answered with the patient voicing complete understanding. 2. HPL:  Remain on crestor, goal LDL < 70. Labs Tasha 1/11/2023: LDL 76, HDL 33, Trig 134      3. Carotid Dz: Followed by Dr. Karma Celeste. 4. HTN:  back on HCTZ, Cr better now. Patient has been instructed and agrees to call our office with any issues or other concerns related to their cardiac condition(s) and/or complaint(s). Return in about 6 months (around 9/14/2023).        Carlos Lopez DO  3/14/2023

## 2023-09-11 ENCOUNTER — OFFICE VISIT (OUTPATIENT)
Age: 73
End: 2023-09-11
Payer: COMMERCIAL

## 2023-09-11 VITALS
HEART RATE: 64 BPM | WEIGHT: 201 LBS | SYSTOLIC BLOOD PRESSURE: 126 MMHG | DIASTOLIC BLOOD PRESSURE: 78 MMHG | HEIGHT: 69 IN | BODY MASS INDEX: 29.77 KG/M2

## 2023-09-11 DIAGNOSIS — I25.10 CORONARY ARTERY DISEASE INVOLVING NATIVE CORONARY ARTERY OF NATIVE HEART WITHOUT ANGINA PECTORIS: Primary | ICD-10-CM

## 2023-09-11 DIAGNOSIS — I10 ESSENTIAL HYPERTENSION: ICD-10-CM

## 2023-09-11 DIAGNOSIS — E78.00 PURE HYPERCHOLESTEROLEMIA: ICD-10-CM

## 2023-09-11 PROCEDURE — 3078F DIAST BP <80 MM HG: CPT | Performed by: INTERNAL MEDICINE

## 2023-09-11 PROCEDURE — 1123F ACP DISCUSS/DSCN MKR DOCD: CPT | Performed by: INTERNAL MEDICINE

## 2023-09-11 PROCEDURE — 3074F SYST BP LT 130 MM HG: CPT | Performed by: INTERNAL MEDICINE

## 2023-09-11 PROCEDURE — 93000 ELECTROCARDIOGRAM COMPLETE: CPT | Performed by: INTERNAL MEDICINE

## 2023-09-11 PROCEDURE — 99214 OFFICE O/P EST MOD 30 MIN: CPT | Performed by: INTERNAL MEDICINE

## 2023-09-11 RX ORDER — LOVASTATIN 40 MG/1
40 TABLET ORAL NIGHTLY
COMMUNITY

## 2023-09-11 RX ORDER — VALSARTAN AND HYDROCHLOROTHIAZIDE 80; 12.5 MG/1; MG/1
1 TABLET, FILM COATED ORAL DAILY
Qty: 90 TABLET | Refills: 3 | Status: SHIPPED | OUTPATIENT
Start: 2023-09-11 | End: 2024-09-10

## 2023-09-11 NOTE — TELEPHONE ENCOUNTER
----- Message from Duarte Rooney DO sent at 9/11/2023 11:11 AM EDT -----  Needs on repatha, cannot take statins, please send in.  Thanks

## 2023-09-11 NOTE — PROGRESS NOTES
[address] [facility], [address]  PHONE: [phone]     23    NAME:  Renny Almaguer  : 1950  MRN: [id number]       SUBJECTIVE:   Renny Almaguer is a 68 y.o. male seen for a follow up visit regarding the following:     Chief Complaint   Patient presents with    Coronary Artery Disease         HPI:   LHC/PCI 2021:  (contrast induced CKD afterwards)    Complex proximal/mid LAD ulcerated mixed plaque morphology treated with [place] drug-eluting stent x2    Mild to moderate nonobstructive disease of the left circumflex and right coronary arteries. Echo 2021: normal EF     Pains on statins now. No CP, BERRY. No new angina at the gym. No new BERRY, edema. diet better now. NO angina, CP. Feeling well now. Patient denies recent history of orthopnea, PND, excessive dizziness and/or syncope. 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 Been on ASA and statin. Past Medical History, Past Surgical History, Family history, Social History, and Medications were all reviewed with the patient today and updated as necessary. Current Outpatient Medications   Medication Sig Dispense Refill    lovastatin (MEVACOR) 40 MG tablet Take 1 tablet by mouth nightly      ticagrelor (BRILINTA) 90 MG TABS tablet Take 1 tablet by mouth 2 times daily 180 tablet 3    valsartan-hydroCHLOROthiazide (DIOVAN-HCT) 80-12.5 MG per tablet Take 1 tablet by mouth daily 90 tablet 3    aspirin 81 MG EC tablet Take 1 tablet by mouth daily      coenzyme Q10 100 MG CAPS capsule Take 2 capsules by mouth daily       No current facility-administered medications for this visit.         Allergies   Allergen Reactions    Oxycodone Hives     itching     Patient Active Problem List    Diagnosis Date Noted    Coronary artery disease involving native coronary artery of native heart without angina pectoris 2021    Chest pain 2021     Added automatically from request

## 2023-09-23 ENCOUNTER — PATIENT MESSAGE (OUTPATIENT)
Age: 73
End: 2023-09-23

## 2023-09-25 NOTE — TELEPHONE ENCOUNTER
PA Case: [id number], Status: Approved, Coverage Starts on: 1/1/2023 12:00:00 AM, Coverage Ends on: 12/31/2023

## 2024-01-15 ENCOUNTER — CLINICAL DOCUMENTATION (OUTPATIENT)
Age: 74
End: 2024-01-15

## 2024-01-16 NOTE — PROGRESS NOTES
Repatha SureClick 140MG/ML auto-injectors    Authorization starting on 01/01/2024 and ending on 12/30/2024.

## 2024-01-22 ENCOUNTER — OFFICE VISIT (OUTPATIENT)
Age: 74
End: 2024-01-22
Payer: COMMERCIAL

## 2024-01-22 VITALS
HEIGHT: 69 IN | WEIGHT: 204 LBS | SYSTOLIC BLOOD PRESSURE: 132 MMHG | DIASTOLIC BLOOD PRESSURE: 74 MMHG | HEART RATE: 64 BPM | BODY MASS INDEX: 30.21 KG/M2

## 2024-01-22 DIAGNOSIS — I10 ESSENTIAL HYPERTENSION: ICD-10-CM

## 2024-01-22 DIAGNOSIS — I25.10 CORONARY ARTERY DISEASE INVOLVING NATIVE CORONARY ARTERY OF NATIVE HEART WITHOUT ANGINA PECTORIS: Primary | ICD-10-CM

## 2024-01-22 DIAGNOSIS — E78.00 PURE HYPERCHOLESTEROLEMIA: ICD-10-CM

## 2024-01-22 PROCEDURE — 3075F SYST BP GE 130 - 139MM HG: CPT | Performed by: INTERNAL MEDICINE

## 2024-01-22 PROCEDURE — 3078F DIAST BP <80 MM HG: CPT | Performed by: INTERNAL MEDICINE

## 2024-01-22 PROCEDURE — 99214 OFFICE O/P EST MOD 30 MIN: CPT | Performed by: INTERNAL MEDICINE

## 2024-01-22 PROCEDURE — 1123F ACP DISCUSS/DSCN MKR DOCD: CPT | Performed by: INTERNAL MEDICINE

## 2024-01-22 NOTE — PROGRESS NOTES
[address] [facility], [place] [address]  PHONE: [phone]     24    NAME:  Ruddy White  : 1950  MRN: [id number]       SUBJECTIVE:   Ruddy White is a 74 y.o. male seen for a follow up visit regarding the following:     Chief Complaint   Patient presents with    Coronary Artery Disease     4 month follow up       HPI:   LHC/PCI 2021:  (contrast induced CKD afterwards)    Complex proximal/mid LAD ulcerated mixed plaque morphology treated with [place] drug-eluting stent x2    Mild to moderate nonobstructive disease of the left circumflex and right coronary arteries.   Echo 2021: normal EF     Pains on statins now.    On repatha.  LDL 70 at [facility] 2023.     No CP, BERRY.  No new angina at the gym.   No new BERRY, edema.    diet better now.  NO angina, CP.  Feeling well now.       Patient denies recent history of orthopnea, PND, excessive dizziness and/or syncope.      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  Been on ASA and statin.       Past Medical History, Past Surgical History, Family history, Social History, and Medications were all reviewed with the patient today and updated as necessary.     Current Outpatient Medications   Medication Sig Dispense Refill    ticagrelor (BRILINTA) 90 MG TABS tablet Take 1 tablet by mouth 2 times daily 180 tablet 3    valsartan-hydroCHLOROthiazide (DIOVAN-HCT) 80-12.5 MG per tablet Take 1 tablet by mouth daily 90 tablet 3    Evolocumab 140 MG/ML SOAJ Inject 140 mg into the skin every 14 days 6 Adjustable Dose Pre-filled Pen Syringe 3    aspirin 81 MG EC tablet Take 1 tablet by mouth daily      coenzyme Q10 100 MG CAPS capsule Take 2 capsules by mouth daily      lovastatin (MEVACOR) 40 MG tablet Take 1 tablet by mouth nightly       No current facility-administered medications for this visit.        Allergies   Allergen Reactions    Oxycodone Hives     itching     Patient Active Problem List    Diagnosis

## 2024-07-31 ENCOUNTER — OFFICE VISIT (OUTPATIENT)
Age: 74
End: 2024-07-31
Payer: COMMERCIAL

## 2024-07-31 VITALS
BODY MASS INDEX: 29.62 KG/M2 | DIASTOLIC BLOOD PRESSURE: 74 MMHG | SYSTOLIC BLOOD PRESSURE: 124 MMHG | HEART RATE: 75 BPM | HEIGHT: 69 IN | WEIGHT: 200 LBS

## 2024-07-31 DIAGNOSIS — I10 ESSENTIAL HYPERTENSION: ICD-10-CM

## 2024-07-31 DIAGNOSIS — E78.00 PURE HYPERCHOLESTEROLEMIA: ICD-10-CM

## 2024-07-31 DIAGNOSIS — I25.10 CORONARY ARTERY DISEASE INVOLVING NATIVE CORONARY ARTERY OF NATIVE HEART WITHOUT ANGINA PECTORIS: Primary | ICD-10-CM

## 2024-07-31 PROCEDURE — 93000 ELECTROCARDIOGRAM COMPLETE: CPT | Performed by: INTERNAL MEDICINE

## 2024-07-31 PROCEDURE — 99214 OFFICE O/P EST MOD 30 MIN: CPT | Performed by: INTERNAL MEDICINE

## 2024-07-31 PROCEDURE — 3074F SYST BP LT 130 MM HG: CPT | Performed by: INTERNAL MEDICINE

## 2024-07-31 PROCEDURE — 3078F DIAST BP <80 MM HG: CPT | Performed by: INTERNAL MEDICINE

## 2024-07-31 PROCEDURE — 1123F ACP DISCUSS/DSCN MKR DOCD: CPT | Performed by: INTERNAL MEDICINE

## 2024-07-31 RX ORDER — VALSARTAN AND HYDROCHLOROTHIAZIDE 80; 12.5 MG/1; MG/1
1 TABLET, FILM COATED ORAL DAILY
Qty: 90 TABLET | Refills: 3 | Status: SHIPPED | OUTPATIENT
Start: 2024-07-31 | End: 2025-07-31

## 2024-07-31 NOTE — PROGRESS NOTES
(DIOVAN-HCT) 80-12.5 MG per tablet; Take 1 tablet by mouth daily          PLAN:   1. CAD:  Remain on DAPT, crestor.   DAPT long term, doing well on this now.      HbA1c 6%.   No more sodas.      Follow for angina at the gym.      The patient has been instructed to call with any angina or equivalent as reviewed today. All questions were answered with the patient voicing complete understanding.        2. HPL:  side effects on crestor.    Better on repatha.    Labs [facility]asha 11/2023:  LDL 70, HDL 40.       3. Carotid Dz:  Followed by Dr. Collins.       4. HTN:   on ARB/HCTZ, Cr better now.           Patient has been instructed and agrees to call our office with any issues or other concerns related to their cardiac condition(s) and/or complaint(s).        Return in about 6 months (around 1/31/2025).       John Gunn, DO  7/31/2024

## 2025-02-11 ENCOUNTER — OFFICE VISIT (OUTPATIENT)
Age: 75
End: 2025-02-11
Payer: MEDICARE

## 2025-02-11 VITALS
HEIGHT: 69 IN | WEIGHT: 200 LBS | HEART RATE: 82 BPM | BODY MASS INDEX: 29.62 KG/M2 | SYSTOLIC BLOOD PRESSURE: 136 MMHG | DIASTOLIC BLOOD PRESSURE: 84 MMHG

## 2025-02-11 DIAGNOSIS — I25.10 CORONARY ARTERY DISEASE INVOLVING NATIVE CORONARY ARTERY OF NATIVE HEART WITHOUT ANGINA PECTORIS: Primary | ICD-10-CM

## 2025-02-11 DIAGNOSIS — I10 ESSENTIAL HYPERTENSION: ICD-10-CM

## 2025-02-11 DIAGNOSIS — E78.00 PURE HYPERCHOLESTEROLEMIA: ICD-10-CM

## 2025-02-11 PROCEDURE — 1126F AMNT PAIN NOTED NONE PRSNT: CPT | Performed by: INTERNAL MEDICINE

## 2025-02-11 PROCEDURE — 3075F SYST BP GE 130 - 139MM HG: CPT | Performed by: INTERNAL MEDICINE

## 2025-02-11 PROCEDURE — 99214 OFFICE O/P EST MOD 30 MIN: CPT | Performed by: INTERNAL MEDICINE

## 2025-02-11 PROCEDURE — 1123F ACP DISCUSS/DSCN MKR DOCD: CPT | Performed by: INTERNAL MEDICINE

## 2025-02-11 PROCEDURE — 3079F DIAST BP 80-89 MM HG: CPT | Performed by: INTERNAL MEDICINE

## 2025-02-11 NOTE — PROGRESS NOTES
[address] [facility], [place] [address]  PHONE: [phone]     25    NAME:  Ruddy White  : 1950  MRN: [id number]       SUBJECTIVE:   Ruddy White is a 75 y.o. male seen for a follow up visit regarding the following:     Chief Complaint   Patient presents with    Coronary Artery Disease       HPI:   LHC/PCI 2021:  (contrast induced CKD afterwards)    Complex proximal/mid LAD ulcerated mixed plaque morphology treated with [place] drug-eluting stent x2    Mild to moderate nonobstructive disease of the left circumflex and right coronary arteries.   Echo 2021: normal EF     Pains on statins now.    On repatha.  LDL 70 at [facility] 2023.   [facility]asha labs 2025:  LDL 75, HDL 48, trig 90, A1c 6.1%     Gym work on the [place].  No CP, BERRY.  No new angina at the gym.   Busy taking care of horses.  No new BERRY, edema.   No angina, CP.  Feeling well now.    Patient denies recent history of orthopnea, PND, excessive dizziness and/or syncope.      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          Past Medical History, Past Surgical History, Family history, Social History, and Medications were all reviewed with the patient today and updated as necessary.     Current Outpatient Medications   Medication Sig Dispense Refill    Evolocumab 140 MG/ML SOAJ Inject 140 mg into the skin every 14 days 6 Adjustable Dose Pre-filled Pen Syringe 3    ticagrelor (BRILINTA) 90 MG TABS tablet Take 1 tablet by mouth 2 times daily 180 tablet 3    valsartan-hydroCHLOROthiazide (DIOVAN-HCT) 80-12.5 MG per tablet Take 1 tablet by mouth daily 90 tablet 3    aspirin 81 MG EC tablet Take 1 tablet by mouth daily      coenzyme Q10 100 MG CAPS capsule Take 2 capsules by mouth daily       No current facility-administered medications for this visit.        Allergies   Allergen Reactions    Oxycodone Hives     itching     Patient Active Problem List    Diagnosis Date Noted    Coronary

## 2025-02-12 ENCOUNTER — CLINICAL DOCUMENTATION (OUTPATIENT)
Age: 75
End: 2025-02-12

## 2025-02-24 ENCOUNTER — TELEPHONE (OUTPATIENT)
Age: 75
End: 2025-02-24

## 2025-02-24 NOTE — TELEPHONE ENCOUNTER
Cardiac Clearance        Physician or Practice Requesting:[facility]  : Faxed over   Contact Phone Number: [phone]  Fax Number: [phone]  Date of Surgery/Procedure: ?  Type of Surgery or Procedure: Colonoscopy  Type of Anesthesia: ?  Type of Clearance Requested: Medication Hold Only  Medication to Hold:Brilinta  Days to Hold: 3 day's prior and up to 10 day's after procedure.

## 2025-02-24 NOTE — TELEPHONE ENCOUNTER
Below recs fine, hold brilinta for 3-4 days prior and 5 days post as needed.  Remain on ASA 81 daily.    Thanks

## 2025-05-14 RX ORDER — EVOLOCUMAB 140 MG/ML
INJECTION, SOLUTION SUBCUTANEOUS
Qty: 2 ADJUSTABLE DOSE PRE-FILLED PEN SYRINGE | Refills: 11 | Status: SHIPPED | OUTPATIENT
Start: 2025-05-14

## 2025-05-14 NOTE — TELEPHONE ENCOUNTER
Requested Prescriptions     Pending Prescriptions Disp Refills    Evolocumab (REPATHA SURECLICK) SOAJ pen [Pharmacy Med Name: REPATHA 140 MG/ML SURECLICK[**^R]] 2 Adjustable Dose Pre-filled Pen Syringe 11     Sig: INJECT 140 MG UNDER THE SKIN EVERY TWO WEEKS INTO THE THIGH, STOMACH, OR UPPER ARM    Verified rx. Last OV 2/11/25. Erx to pharm on file.

## 2025-08-19 ENCOUNTER — OFFICE VISIT (OUTPATIENT)
Age: 75
End: 2025-08-19
Payer: MEDICARE

## 2025-08-19 VITALS
DIASTOLIC BLOOD PRESSURE: 82 MMHG | SYSTOLIC BLOOD PRESSURE: 130 MMHG | BODY MASS INDEX: 29.47 KG/M2 | HEIGHT: 69 IN | WEIGHT: 199 LBS | HEART RATE: 72 BPM

## 2025-08-19 DIAGNOSIS — E78.00 PURE HYPERCHOLESTEROLEMIA: ICD-10-CM

## 2025-08-19 DIAGNOSIS — I10 ESSENTIAL HYPERTENSION: ICD-10-CM

## 2025-08-19 DIAGNOSIS — I25.10 CORONARY ARTERY DISEASE INVOLVING NATIVE CORONARY ARTERY OF NATIVE HEART WITHOUT ANGINA PECTORIS: Primary | ICD-10-CM

## 2025-08-19 PROBLEM — R07.9 CHEST PAIN: Status: RESOLVED | Noted: 2021-09-13 | Resolved: 2025-08-19

## 2025-08-19 PROCEDURE — 93000 ELECTROCARDIOGRAM COMPLETE: CPT | Performed by: INTERNAL MEDICINE

## 2025-08-19 PROCEDURE — 3075F SYST BP GE 130 - 139MM HG: CPT | Performed by: INTERNAL MEDICINE

## 2025-08-19 PROCEDURE — 1159F MED LIST DOCD IN RCRD: CPT | Performed by: INTERNAL MEDICINE

## 2025-08-19 PROCEDURE — 1126F AMNT PAIN NOTED NONE PRSNT: CPT | Performed by: INTERNAL MEDICINE

## 2025-08-19 PROCEDURE — 99214 OFFICE O/P EST MOD 30 MIN: CPT | Performed by: INTERNAL MEDICINE

## 2025-08-19 PROCEDURE — 3079F DIAST BP 80-89 MM HG: CPT | Performed by: INTERNAL MEDICINE

## 2025-08-19 PROCEDURE — 1123F ACP DISCUSS/DSCN MKR DOCD: CPT | Performed by: INTERNAL MEDICINE

## (undated) DEVICE — CATHETER VASC 6 FR DIAG PGTL W/ SIDE H COR 110 CM LEN W/OUT

## (undated) DEVICE — CATHETER GUID AD 6FR L100CM COR PERIPH GRN NYL PTFE XB L 3

## (undated) DEVICE — GUIDEWIRE 035IN 210CM PTFE COAT FIX COR J TIP 15MM FIRM BODY

## (undated) DEVICE — CATHETER DIAG SM AD 6FR L100CM 0.038IN COR POLYUR JUDKINS L

## (undated) DEVICE — GLIDESHEATH SLENDER STAINLESS STEEL KIT: Brand: GLIDESHEATH SLENDER

## (undated) DEVICE — PTCA DILATATION CATHETER: Brand: NC QUANTUM APEX™

## (undated) DEVICE — BAND RADIAL COMPR ARTERY 24CM -- REG BX/10

## (undated) DEVICE — COPILOT BLEEDBACK CONTROL VALVE: Brand: COPILOT

## (undated) DEVICE — RUNTHROUGH NS EXTRA FLOPPY PTCA GUIDEWIRE: Brand: RUNTHROUGH

## (undated) DEVICE — RADIFOCUS OPTITORQUE ANGIOGRAPHIC CATHETER: Brand: OPTITORQUE

## (undated) DEVICE — CATH DIAG F6 TL 3DRC 100CM -- INFINITI - ORDER AS EA